# Patient Record
Sex: FEMALE | Race: WHITE | NOT HISPANIC OR LATINO | URBAN - METROPOLITAN AREA
[De-identification: names, ages, dates, MRNs, and addresses within clinical notes are randomized per-mention and may not be internally consistent; named-entity substitution may affect disease eponyms.]

---

## 2017-12-11 ENCOUNTER — EMERGENCY (EMERGENCY)
Facility: HOSPITAL | Age: 77
LOS: 0 days | Discharge: HOME | End: 2017-12-11

## 2017-12-11 DIAGNOSIS — Z87.891 PERSONAL HISTORY OF NICOTINE DEPENDENCE: ICD-10-CM

## 2017-12-11 DIAGNOSIS — R07.89 OTHER CHEST PAIN: ICD-10-CM

## 2017-12-11 DIAGNOSIS — Z79.899 OTHER LONG TERM (CURRENT) DRUG THERAPY: ICD-10-CM

## 2017-12-11 DIAGNOSIS — J44.9 CHRONIC OBSTRUCTIVE PULMONARY DISEASE, UNSPECIFIED: ICD-10-CM

## 2017-12-11 DIAGNOSIS — Z79.82 LONG TERM (CURRENT) USE OF ASPIRIN: ICD-10-CM

## 2017-12-11 DIAGNOSIS — Z79.02 LONG TERM (CURRENT) USE OF ANTITHROMBOTICS/ANTIPLATELETS: ICD-10-CM

## 2020-12-03 ENCOUNTER — INPATIENT (INPATIENT)
Facility: HOSPITAL | Age: 80
LOS: 0 days | Discharge: HOME | End: 2020-12-04
Attending: SURGERY | Admitting: SURGERY
Payer: MEDICARE

## 2020-12-03 VITALS
DIASTOLIC BLOOD PRESSURE: 91 MMHG | SYSTOLIC BLOOD PRESSURE: 180 MMHG | OXYGEN SATURATION: 100 % | RESPIRATION RATE: 18 BRPM | TEMPERATURE: 98 F | HEART RATE: 87 BPM

## 2020-12-03 LAB
ALBUMIN SERPL ELPH-MCNC: 4.3 G/DL — SIGNIFICANT CHANGE UP (ref 3.5–5.2)
ALP SERPL-CCNC: 100 U/L — SIGNIFICANT CHANGE UP (ref 30–115)
ALT FLD-CCNC: 28 U/L — SIGNIFICANT CHANGE UP (ref 0–41)
ANION GAP SERPL CALC-SCNC: 10 MMOL/L — SIGNIFICANT CHANGE UP (ref 7–14)
APTT BLD: 29.9 SEC — SIGNIFICANT CHANGE UP (ref 27–39.2)
AST SERPL-CCNC: 56 U/L — HIGH (ref 0–41)
BASE EXCESS BLDV CALC-SCNC: 2.6 MMOL/L — HIGH (ref -2–2)
BASOPHILS # BLD AUTO: 0 K/UL — SIGNIFICANT CHANGE UP (ref 0–0.2)
BASOPHILS NFR BLD AUTO: 0 % — SIGNIFICANT CHANGE UP (ref 0–1)
BILIRUB SERPL-MCNC: 0.2 MG/DL — SIGNIFICANT CHANGE UP (ref 0.2–1.2)
BUN SERPL-MCNC: 17 MG/DL — SIGNIFICANT CHANGE UP (ref 10–20)
CALCIUM SERPL-MCNC: 8.8 MG/DL — SIGNIFICANT CHANGE UP (ref 8.5–10.1)
CHLORIDE SERPL-SCNC: 106 MMOL/L — SIGNIFICANT CHANGE UP (ref 98–110)
CO2 SERPL-SCNC: 23 MMOL/L — SIGNIFICANT CHANGE UP (ref 17–32)
CREAT SERPL-MCNC: 0.7 MG/DL — SIGNIFICANT CHANGE UP (ref 0.7–1.5)
EOSINOPHIL # BLD AUTO: 0 K/UL — SIGNIFICANT CHANGE UP (ref 0–0.7)
EOSINOPHIL NFR BLD AUTO: 0 % — SIGNIFICANT CHANGE UP (ref 0–8)
ETHANOL SERPL-MCNC: <10 MG/DL — SIGNIFICANT CHANGE UP
GIANT PLATELETS BLD QL SMEAR: PRESENT — SIGNIFICANT CHANGE UP
GLUCOSE SERPL-MCNC: 94 MG/DL — SIGNIFICANT CHANGE UP (ref 70–99)
HCO3 BLDV-SCNC: 29 MMOL/L — SIGNIFICANT CHANGE UP (ref 22–29)
HCT VFR BLD CALC: 40.3 % — SIGNIFICANT CHANGE UP (ref 37–47)
HGB BLD-MCNC: 12.4 G/DL — SIGNIFICANT CHANGE UP (ref 12–16)
INR BLD: 0.92 RATIO — SIGNIFICANT CHANGE UP (ref 0.65–1.3)
LACTATE BLDV-MCNC: 0.9 MMOL/L — SIGNIFICANT CHANGE UP (ref 0.5–1.6)
LACTATE SERPL-SCNC: 1 MMOL/L — SIGNIFICANT CHANGE UP (ref 0.7–2)
LIDOCAIN IGE QN: 53 U/L — SIGNIFICANT CHANGE UP (ref 7–60)
LYMPHOCYTES # BLD AUTO: 1.91 K/UL — SIGNIFICANT CHANGE UP (ref 1.2–3.4)
LYMPHOCYTES # BLD AUTO: 11.3 % — LOW (ref 20.5–51.1)
MANUAL SMEAR VERIFICATION: SIGNIFICANT CHANGE UP
MCHC RBC-ENTMCNC: 27.5 PG — SIGNIFICANT CHANGE UP (ref 27–31)
MCHC RBC-ENTMCNC: 30.8 G/DL — LOW (ref 32–37)
MCV RBC AUTO: 89.4 FL — SIGNIFICANT CHANGE UP (ref 81–99)
MONOCYTES # BLD AUTO: 0.88 K/UL — HIGH (ref 0.1–0.6)
MONOCYTES NFR BLD AUTO: 5.2 % — SIGNIFICANT CHANGE UP (ref 1.7–9.3)
MYELOCYTES NFR BLD: 1.7 % — HIGH (ref 0–0)
NEUTROPHILS # BLD AUTO: 13.37 K/UL — HIGH (ref 1.4–6.5)
NEUTROPHILS NFR BLD AUTO: 75.7 % — HIGH (ref 42.2–75.2)
NEUTS BAND # BLD: 3.5 % — SIGNIFICANT CHANGE UP (ref 0–6)
NRBC # BLD: 1 /100 — HIGH (ref 0–0)
NRBC # BLD: SIGNIFICANT CHANGE UP /100 WBCS (ref 0–0)
PCO2 BLDV: 51 MMHG — SIGNIFICANT CHANGE UP (ref 41–51)
PH BLDV: 7.36 — SIGNIFICANT CHANGE UP (ref 7.26–7.43)
PLAT MORPH BLD: NORMAL — SIGNIFICANT CHANGE UP
PLATELET # BLD AUTO: 282 K/UL — SIGNIFICANT CHANGE UP (ref 130–400)
PO2 BLDV: 37 MMHG — SIGNIFICANT CHANGE UP (ref 20–40)
POIKILOCYTOSIS BLD QL AUTO: SIGNIFICANT CHANGE UP
POTASSIUM SERPL-MCNC: 6.2 MMOL/L — CRITICAL HIGH (ref 3.5–5)
POTASSIUM SERPL-SCNC: 6.2 MMOL/L — CRITICAL HIGH (ref 3.5–5)
PROT SERPL-MCNC: 7.1 G/DL — SIGNIFICANT CHANGE UP (ref 6–8)
PROTHROM AB SERPL-ACNC: 10.6 SEC — SIGNIFICANT CHANGE UP (ref 9.95–12.87)
RBC # BLD: 4.51 M/UL — SIGNIFICANT CHANGE UP (ref 4.2–5.4)
RBC # FLD: 14.3 % — SIGNIFICANT CHANGE UP (ref 11.5–14.5)
RBC BLD AUTO: ABNORMAL
SAO2 % BLDV: 69 % — SIGNIFICANT CHANGE UP
SODIUM SERPL-SCNC: 139 MMOL/L — SIGNIFICANT CHANGE UP (ref 135–146)
TROPONIN T SERPL-MCNC: <0.01 NG/ML — SIGNIFICANT CHANGE UP
VARIANT LYMPHS # BLD: 2.6 % — SIGNIFICANT CHANGE UP (ref 0–5)
WBC # BLD: 16.88 K/UL — HIGH (ref 4.8–10.8)
WBC # FLD AUTO: 16.88 K/UL — HIGH (ref 4.8–10.8)

## 2020-12-03 PROCEDURE — 93010 ELECTROCARDIOGRAM REPORT: CPT

## 2020-12-03 PROCEDURE — 72125 CT NECK SPINE W/O DYE: CPT | Mod: 26

## 2020-12-03 PROCEDURE — 99223 1ST HOSP IP/OBS HIGH 75: CPT

## 2020-12-03 PROCEDURE — 70450 CT HEAD/BRAIN W/O DYE: CPT | Mod: 26

## 2020-12-03 PROCEDURE — 71260 CT THORAX DX C+: CPT | Mod: 26

## 2020-12-03 PROCEDURE — 72170 X-RAY EXAM OF PELVIS: CPT | Mod: 26

## 2020-12-03 PROCEDURE — 71045 X-RAY EXAM CHEST 1 VIEW: CPT | Mod: 26

## 2020-12-03 PROCEDURE — 99285 EMERGENCY DEPT VISIT HI MDM: CPT

## 2020-12-03 PROCEDURE — 74177 CT ABD & PELVIS W/CONTRAST: CPT | Mod: 26

## 2020-12-03 RX ORDER — MORPHINE SULFATE 50 MG/1
2 CAPSULE, EXTENDED RELEASE ORAL ONCE
Refills: 0 | Status: DISCONTINUED | OUTPATIENT
Start: 2020-12-03 | End: 2020-12-03

## 2020-12-03 RX ORDER — IBUPROFEN 200 MG
600 TABLET ORAL ONCE
Refills: 0 | Status: COMPLETED | OUTPATIENT
Start: 2020-12-03 | End: 2020-12-03

## 2020-12-03 RX ORDER — MORPHINE SULFATE 50 MG/1
4 CAPSULE, EXTENDED RELEASE ORAL ONCE
Refills: 0 | Status: DISCONTINUED | OUTPATIENT
Start: 2020-12-03 | End: 2020-12-03

## 2020-12-03 RX ORDER — SODIUM CHLORIDE 9 MG/ML
1000 INJECTION INTRAMUSCULAR; INTRAVENOUS; SUBCUTANEOUS ONCE
Refills: 0 | Status: COMPLETED | OUTPATIENT
Start: 2020-12-03 | End: 2020-12-03

## 2020-12-03 RX ADMIN — Medication 600 MILLIGRAM(S): at 20:10

## 2020-12-03 RX ADMIN — MORPHINE SULFATE 2 MILLIGRAM(S): 50 CAPSULE, EXTENDED RELEASE ORAL at 16:35

## 2020-12-03 RX ADMIN — MORPHINE SULFATE 4 MILLIGRAM(S): 50 CAPSULE, EXTENDED RELEASE ORAL at 14:49

## 2020-12-03 RX ADMIN — SODIUM CHLORIDE 1000 MILLILITER(S): 9 INJECTION INTRAMUSCULAR; INTRAVENOUS; SUBCUTANEOUS at 14:49

## 2020-12-03 NOTE — ED PROVIDER NOTE - CLINICAL SUMMARY MEDICAL DECISION MAKING FREE TEXT BOX
Patient presents after a fall. labs, imaging done. Found to have an age indeterminant compression fracture. Seen by neurosurgery and trauma. no acute intervention. Attempt made to ambulate the patient who felt week and did not feel comfortable ambulating. Patient admitted for further management.

## 2020-12-03 NOTE — ED PROVIDER NOTE - ATTENDING CONTRIBUTION TO CARE
81 yo F pmh htn, cad on plavix pw fall. Mechanical trip and fall from approximately 3 feet. C/o upper back and chest wall pain. No cp prior to fall. No head trauma, no loc, no n/v, no abd pain, no palpitations, no sob, no extremity pain, no neck pain.     TRAUMA: Primary and Secondary surveys intact, GCS 15, no midline CTLS spine tenderness, Pelvis stable, + moving all extremities, FAST Negative   CONSTITUTIONAL: Well-developed; well-nourished; in no acute distress. Sitting up and providing appropriate history and physical examination  SKIN: skin exam is warm and dry, no acute rash.  HEAD: Normocephalic; atraumatic.  EYES: PERRL, 3 mm bilateral, no nystagmus, EOM intact; conjunctiva and sclera clear.  ENT: No nasal discharge; airway clear.  NECK: Supple; non tender. + full passive ROM in all directions. No JVD  CARD: S1, S2 normal; no murmurs, gallops, or rubs. Regular rate and rhythm. + Symmetric Strong Pulses  RESP: No wheezes, rales or rhonchi. Good air movement bilaterally  ABD: soft; non-distended; non-tender. No Rebound, No Guarding, No signs of peritonitis, No CVA tenderness. No pulsatile abdominal mass. + Strong and Symmetric Pulses  EXT: +ttp diffusely over anterior chest wall, +ttp over midline thoracic spine. Normal ROM. No clubbing, cyanosis or edema. Dp and Pt Pulses intact. Cap refill less than 3 seconds  NEURO: CN 2-12 intact, normal finger to nose, normal romberg, no sensory or motor deficits, Alert, oriented, grossly unremarkable. No Focal deficits. GCS 15. NIH 0  PSYCH: Cooperative, appropriate.

## 2020-12-03 NOTE — ED PROVIDER NOTE - OBJECTIVE STATEMENT
80F with PMHx of HTN, CAD on ASA and Plavix s/p stents in 2019 presents to ED for fall today. Pt fell off her front stoop, about 3 feet, no head trauma, no LOC, no AC, +ASA/Plavix. In ED pt complaining of chest wall pain and left flank pain. Endorses mild sob. No cough, abd pain, nausea, vomiting, chest pain, other injuries.

## 2020-12-03 NOTE — ED ADULT NURSE NOTE - NSIMPLEMENTINTERV_GEN_ALL_ED
Implemented All Fall with Harm Risk Interventions:  [place] to call system. Call bell, personal items and telephone within reach. Instruct patient to call for assistance. Room bathroom lighting operational. Non-slip footwear when patient is off stretcher. Physically safe environment: no spills, clutter or unnecessary equipment. Stretcher in lowest position, wheels locked, appropriate side rails in place. Provide visual cue, wrist band, yellow gown, etc. Monitor gait and stability. Monitor for mental status changes and reorient to person, place, and time. Review medications for side effects contributing to fall risk. Reinforce activity limits and safety measures with patient and family. Provide visual clues: red socks.

## 2020-12-03 NOTE — H&P ADULT - ASSESSMENT
ASSESSMENT:  80yF w/ PMHx of HTN and CAD on ASA and Plavix seen as trauma consult s/p fall from steps (about 4 feet) earlier today. Denies LoC, head trauma, and is not on AC, just ASA/Plavix. Trauma assessment in ED: ABCs intact , GCS 15 , AAOx3. Patient reports that she was standing on a pat[employer] stair plat form in the front of her daughter's house when she fell off the side, straight down. Patient reports she does not recall exactly what she landed on, but denies head trauma. Now is complaining of pain mostly in center of her upper back. Patient reports some shortness of breath immediately after fall that has since resolved.   Trauma assessment in ED: ABCs intact , GCS 15 , AAOx3,  FRANKS.     Patient's only injury identified does, age indeterminate mild T6 and L1 vertebral body compression fractures, do not correspond to her pain or trauma this evening. These are likely old fractures. Patient is requesting to go home tonight if she is cleared.    Injuries identified:   - Age indeterminate mild T6 and L1 vertebral body compression fractures    After some time of observation in the ED, patient was having trouble getting up and walking, and did not feel comfortably going home tonight.    PLAN:   - Patient to be admitted to trauma service for further observation and evaluation  - Pain control  - PT/Rehab to see in the morning  - Repeat of potassium level  - Should be given a TSLO brace and Abdominal Binder      Neurosurgery evaluated the patient:  -No acute Neurosurgical intervention  -Conservative therapy with analgesics and PT rehab  -Abdominal binder/TLSO brace  -F/u Neurosurgery x 2 weeks    Senior Resident Addendum  Narrative as above, 80F s/p mechanical trip and fall off porch, -HT, -LOC, on plavix/ASA, complaining of some chest pain. External signs of trauma limited to swelling at base of neck, tender, likely developing hematoma, also visible on CT. CT AP demonstrating age indeterminate T6, L1 fx. Nontender on exam. Cleared from NSY perspective for dc, recommending abdominal binder vs TLSO brace and outpatient followup.       Above plan discussed with Trauma attending, Dr. Altamirano, patient, patient family, and ED team

## 2020-12-03 NOTE — H&P ADULT - ATTENDING COMMENTS
This is 81 y/o female with PMHx of HTN and CAD on ASA and Plavix, s/p fall from steps (about 4 feet) earlier today. Denies LoC, head trauma, and is not on AC. Patient complains of back pain. Has hematoma on the upper back.    Primary and secondary survey were performed.    Airway intact.  GCS 15.  Neck: no step-offs, no pain.  Chest: bilateral breath sounds   Back: hematoma in the upper back and pain to palpation in the T- and L spine.  CV : rrr  Abdomen: soft, nontender.    All images and labs were reviewed.    ASSESSMENT:  81 y/o female, S/P Fall.  T6 compression fracture.  L1 compression fracture.  Back hematoma.  Acute pain due to trauma.    PLAN:  - Neurosurgery evaluation  - pain control  - PT evaluation  - DVT prophylaxis    This note reflects my exam and care of this patient on 12/03/2020.

## 2020-12-03 NOTE — ED ADULT NURSE REASSESSMENT NOTE - NS ED NURSE REASSESS COMMENT FT1
Pt received from previous RN. Pt is awake and alert. Pt given motrin for pain. Trauma team with pt. Pt is not in any distress. Fall precautions maintained. Cardiac and o2 monitoring maintained. Safety and comfort.

## 2020-12-03 NOTE — ED PROVIDER NOTE - PROGRESS NOTE DETAILS
Pt signed out to Dr. Montano, pending CTs, trauma consult. Received sign out from Dr. Arriaga pending ct reads. Found to have age indeterminant compression fractures. trauma consult placed, neurosurgery consulted. SL: Trauma consult placed for compression fractures T6/L1 of undetermined age.  Neurosurgery called - will see patient. SL: Spoke to pt's daughter Cindy Ro. Her phone number is [phone] for updates. AA: Currently unwilling to ambulate. Will s/o and reassess. patient given pain medication and attempted to ambulate. States that she is feeling weak and feels more comfortable staying in the hospital. Surgery notified. Will admit to their service.

## 2020-12-03 NOTE — H&P ADULT - NSHPPHYSICALEXAM_GEN_ALL_CORE
Primary Survey:    A - airway intact  B - bilateral breath sounds and good chest rise  C - palpable pulses in all extremities  D - GCS 15 on arrival, FRANKS  Exposure obtained    Vital Signs Last 24 Hrs  T(C): 36.4 (03 Dec 2020 14:25), Max: 36.4 (03 Dec 2020 14:25)  T(F): 97.6 (03 Dec 2020 14:25), Max: 97.6 (03 Dec 2020 14:25)  HR: 87 (03 Dec 2020 14:25) (87 - 87)  BP: 180/91 (03 Dec 2020 14:25) (180/91 - 180/91)  BP(mean): --  RR: 18 (03 Dec 2020 14:25) (18 - 18)  SpO2: 100% (03 Dec 2020 14:25) (100% - 100%)    Secondary Survey:   General: NAD  HEENT: Normocephalic, atraumatic, EOMI, PEERLA. no scalp lacerations   Neck: Soft, midline trachea. no c-spine tenderness  Chest: Moderate chest wall tenderness diffusely, L>R, no subcutaneous emphysema   Cardiac: S1, S2, RRR  Respiratory: Bilateral breath sounds, clear and equal bilaterally  Abdomen: Soft, non-distended, non-tender, no rebound, no guarding.  Groin: Normal appearing, pelvis stable   Ext:  Moving b/l upper and lower extremities. Palpable Radial b/l UE, b/l DP palpable in LE.   Back: Moderate tenderness to the center of thoracic spine around T1, with edema and erythema. No Lumbar or Sacral tenderness. No palpable runoff/stepoff/deformity  Rectal: No jaylon blood, ROSA with good tone

## 2020-12-03 NOTE — ED PROVIDER NOTE - PHYSICAL EXAMINATION
Constitutional: elderly woman laying in stretcher in NAD  TRAUMA: ABC intact. GCS 15. FAST negative.  Head: Normocephalic, atraumatic.  Eyes: PERRL. EOMI. No Raccoon eyes.   ENT: No nasal discharge. No septal hematoma. No Henry sign. Mucous membranes moist.  Neck: Supple. Painless ROM. No midline tenderness, stepoffs.  Cardiovascular: Normal S1, S2. Regular rate and rhythm. No murmurs, rubs, or gallops.  Pulmonary: Normal respiratory rate and effort. Lungs clear to auscultation bilaterally. No wheezing, rales, or rhonchi. Lung sliding appreciated on US bilaterally.  CHEST: +L chest wall tenderness, crepitus.  Abdominal: Soft. Nondistended. Nontender. No rebound, guarding, rigidity.  BACK: No midline T/L/S tenderness, stepoffs. No saddle paresthesia.  Extremities. Pelvis stable. No traumatic deformities, tenderness of extremities.  Skin: No rashes, cyanosis, lacerations, abrasions.  Neuro: AAOx3. Strength 5/5 in all extremities. Sensation intact throughout. No focal neurological deficits.  Psych: Normal mood. Normal affect.

## 2020-12-03 NOTE — CONSULT NOTE ADULT - ASSESSMENT
ASSESSMENT:  80yF w/ PMHx of HTN and CAD on ASA and Plavix seen as trauma consult s/p fall from steps (about 4 feet) earlier today. Denies LoC, head trauma, and is not on AC, just ASA/Plavix. Trauma assessment in ED: ABCs intact , GCS 15 , AAOx3. Patient reports that she was standing on a [employer] stair plat form in the front of her daughter's house when she fell off the side, straight down. Patient reports she does not recall exactly what she landed on, but denies head trauma. Now is complaining of pain mostly in center of her upper back. Patient reports some shortness of breath immediately after fall that has since resolved.   Trauma assessment in ED: ABCs intact , GCS 15 , AAOx3,  FRANKS.     Patient's only injury identified does, age indeterminate mild T6 and L1 vertebral body compression fractures, do not correspond to her pain or trauma this evening. These are likely old fractures. Patient is requesting to go home tonight if she is cleared.    Injuries identified:   - Age indeterminate mild T6 and L1 vertebral body compression fractures    PLAN:     - Patient is cleared from a trauma surgery standpoint  - Recommend repeat of potassium level before discharge  - Should be given a TSLO brace and Abdominal Binder  - Can followup with Dr. Altamirano in 1-2 weeks.    Neurosurgery evaluated the patient:  -No acute Neurosurgical intervention  -Conservative therapy with analgesics and PT rehab  -Abdominal binder/TLSO brace  -F/u Neurosurgery x 2 weeks        Above plan discussed with Trauma attending, Dr. Altamirano, patient, patient family, and ED team  --------------------------------------------------------------------------------------  12-03-20 @ 19:20   ASSESSMENT:  80yF w/ PMHx of HTN and CAD on ASA and Plavix seen as trauma consult s/p fall from steps (about 4 feet) earlier today. Denies LoC, head trauma, and is not on AC, just ASA/Plavix. Trauma assessment in ED: ABCs intact , GCS 15 , AAOx3. Patient reports that she was standing on a [employer] stair plat form in the front of her daughter's house when she fell off the side, straight down. Patient reports she does not recall exactly what she landed on, but denies head trauma. Now is complaining of pain mostly in center of her upper back. Patient reports some shortness of breath immediately after fall that has since resolved.   Trauma assessment in ED: ABCs intact , GCS 15 , AAOx3,  FRANKS.     Patient's only injury identified does, age indeterminate mild T6 and L1 vertebral body compression fractures, do not correspond to her pain or trauma this evening. These are likely old fractures. Patient is requesting to go home tonight if she is cleared.    Injuries identified:   - Age indeterminate mild T6 and L1 vertebral body compression fractures    PLAN:     - Patient is cleared from a trauma surgery standpoint  - Recommend repeat of potassium level, hemolyzed before discharge  - Should be given a TSLO brace and Abdominal Binder  - Can followup with Dr. Altamirano in 1-2 weeks.    Neurosurgery evaluated the patient:  -No acute Neurosurgical intervention  -Conservative therapy with analgesics and PT rehab  -Abdominal binder/TLSO brace  -F/u Neurosurgery x 2 weeks    Senior Resident Addendum  Narrative as above, 80F s/p mechanical trip and fall off porch, -HT, -LOC, on plavix/ASA, complaining of some chest pain. External signs of trauma limited to swelling at base of neck, tender, likely developing hematoma, also visible on CT. CT AP demonstrating age indeterminate T6, L1 fx. Nontender on exam. Cleared from NSY perspective for dc, recommending abdominal binder vs TLSO brace and outpatient followup. Cleared from trauma perspective, patient reporting that she wishes to go home.       Above plan discussed with Trauma attending, Dr. Altamirano, patient, patient family, and ED team  --------------------------------------------------------------------------------------  12-03-20 @ 19:20

## 2020-12-03 NOTE — CONSULT NOTE ADULT - SUBJECTIVE AND OBJECTIVE BOX
TRAUMA ACTIVATION LEVEL:  TRAUMA CONSULT    MECHANISM OF INJURY:   [] Blunt     [] MVC	  [x] Fall	  [] Pedestrian Struck	  [] Motorcycle     [] Assault     [] Bicycle collision    [] Sports injury    [] Penetrating    [] Gun Shot Wound      [] Stab Wound    GCS: 15 	E: 4	V: 5	M: 6    HPI:  80yF w/ PMHx of HTN and CAD on ASA and Plavix seen as trauma consult s/p fall from steps (about 4 feet) earlier today. Denies LoC, head trauma, and is not on AC, just ASA/Plavix. Trauma assessment in ED: ABCs intact , GCS 15 , AAOx3. Patient reports that she was standing on a [employer] stair plat form in the front of her daughter's house when she fell off the side, straight down. Patient reports she does not recall exactly what she landed on, but denies head trauma. Now is complaining of pain mostly in center of her upper back. Patient reports some shortness of breath immediately after fall that has since resolved. Otherwise: No fevers, no chills, no abdominal pain, no nausea, no vomiting, no other recent trauma.    PAST MEDICAL & SURGICAL HISTORY:  Hypertension  CAD (coronary artery disease)      Allergies    No Known Allergies    Intolerances      Home Medications:  Atorvastatin 80  Diltiazem 30 TID  ASA 81  Plavix 75 every  other day    ROS: 10-system review is otherwise negative except HPI above.      Primary Survey:    A - airway intact  B - bilateral breath sounds and good chest rise  C - palpable pulses in all extremities  D - GCS 15 on arrival, FRANKS  Exposure obtained    Vital Signs Last 24 Hrs  T(C): 36.4 (03 Dec 2020 14:25), Max: 36.4 (03 Dec 2020 14:25)  T(F): 97.6 (03 Dec 2020 14:25), Max: 97.6 (03 Dec 2020 14:25)  HR: 87 (03 Dec 2020 14:25) (87 - 87)  BP: 180/91 (03 Dec 2020 14:25) (180/91 - 180/91)  BP(mean): --  RR: 18 (03 Dec 2020 14:25) (18 - 18)  SpO2: 100% (03 Dec 2020 14:25) (100% - 100%)    Secondary Survey:   General: NAD  HEENT: Normocephalic, atraumatic, EOMI, PEERLA. no scalp lacerations   Neck: Soft, midline trachea. no c-spine tenderness  Chest: Moderate chest wall tenderness diffusely, L>R, no subcutaneous emphysema   Cardiac: S1, S2, RRR  Respiratory: Bilateral breath sounds, clear and equal bilaterally  Abdomen: Soft, non-distended, non-tender, no rebound, no guarding.  Groin: Normal appearing, pelvis stable   Ext:  Moving b/l upper and lower extremities. Palpable Radial b/l UE, b/l DP palpable in LE.   Back: Moderate tenderness to the center of thoracic spine around T1, with edema and erythema. No Lumbar or Sacral tenderness. No palpable runoff/stepoff/deformity  Rectal: No jaylon blood, ROSA with good tone    Labs:  CAPILLARY BLOOD GLUCOSE                              12.4   16.88 )-----------( 282      ( 03 Dec 2020 14:58 )             40.3       Auto Neutrophil %: 75.7 % (12-03-20 @ 14:58)  Band Neutrophils %: 3.5 % (12-03-20 @ 14:58)    12-03    139  |  106  |  17  ----------------------------<  94  6.2<HH>   |  23  |  0.7      Calcium, Total Serum: 8.8 mg/dL (12-03-20 @ 14:58)      LFTs:             7.1  | 0.2  | 56       ------------------[100     ( 03 Dec 2020 14:58 )  4.3  | x    | 28          Lipase:53     Amylase:x        Lactate, Blood: 1.0 mmol/L (12-03-20 @ 14:58)  Blood Gas Venous - Lactate: 0.9 mmoL/L (12-03-20 @ 14:52)      Coags:     10.60  ----< 0.92    ( 03 Dec 2020 14:58 )     29.9        CARDIAC MARKERS ( 03 Dec 2020 14:58 )  x     / <0.01 ng/mL / x     / x     / x          Alcohol, Blood: <10 mg/dL (12-03-20 @ 14:58)      RADIOLOGY & ADDITIONAL STUDIES:  < from: CT Head No Cont (12.03.20 @ 16:23) >  IMPRESSION:  1.  Motion limited study. No evidence of acute intracranial hemorrhage.  2.  Moderate to severe chronic microvascular changes.  < end of copied text >    < from: CT Cervical Spine No Cont (12.03.20 @ 16:23) >  IMPRESSION:  1.  No evidence of acute cervical spine fracture or subluxation.  2.  Diffuse osteopenia and mild-moderate degenerative changes.  < end of copied text >    < from: CT Chest w/ IV Cont (12.03.20 @ 16:24) >  IMPRESSION: No acute intrathoracic or intra-abdominal/pelvic traumatic changes.  Centrilobular emphysema.  Subsegmental atelectasis of right middle and lower lobes.  Mild intrahepatic biliary ductal dilatation, cholelithiasis and porcelain gallbladder.  Two left ovarian/adnexal cystic lesions measuring 2.6 cm each. In a postmenopausal patient, recommend pelvic ultrasound follow-up in 3-6 months.  Age indeterminate mild T6 and L1 vertebral body compression fractures, as above.  Infrarenal abdominal aortic aneurysm measuring up to 3.9 cm.  < end of copied text >    < from: CT Abdomen and Pelvis w/ IV Cont (12.03.20 @ 16:24) >  IMPRESSION: No acute intrathoracic or intra-abdominal/pelvic traumatic changes.  Centrilobular emphysema.  Subsegmental atelectasis of right middle and lower lobes.  Mild intrahepatic biliary ductal dilatation, cholelithiasis and porcelain gallbladder.  Two left ovarian/adnexal cystic lesions measuring 2.6 cm each. In a postmenopausal patient, recommend pelvic ultrasound follow-up in 3-6 months.  Age indeterminate mild T6 and L1 vertebral body compression fractures, as above.  Infrarenal abdominal aortic aneurysm measuring up to 3.9 cm.  < end of copied text >    ---------------------------------------------------------------------------------------

## 2020-12-03 NOTE — ED PROVIDER NOTE - NS ED ROS FT
Constitutional: No altered mental status.  Eyes: No visual changes.  ENT: No hearing loss.  Neck: No neck pain or stiffness.  Cardiovascular: +chest wall pain. No palpitations.  Pulmonary: +SOB. No hemoptysis.  Abdominal: No abdominal pain, nausea, vomiting.   Neuro: No headache, syncope, dizziness.  MS: No back pain. No deformities.

## 2020-12-03 NOTE — CONSULT NOTE ADULT - SUBJECTIVE AND OBJECTIVE BOX
HPI:  This is a 80 year old female with PMH of htn, cad on asa/plvix presenting to the ED s/p fall -HT, -LOC.  The pt was on her front mikaela today and fell backwards while trying to gain control of her dogs.  Work up revealing: L1 vertebral body compression fracture and mild compression deformity of T6 vertebral body.    Pt examined at the bedside in ED.  The Pt is currently laying in bed comfortably, A+Ox3 in NAD.  The pt is however endorsing b/l shoulder pain,  and non radiating mid thoracic pain on flexion-extension of the hip, otherwise with no other complaints,   pt denies: HT, weakness, numbness, paraesthesia, radicular pain, paralysis, n/v      PAST MEDICAL & SURGICAL HISTORY:  Hypertension    CAD (coronary artery disease)        Home Medications:      Allergies    No Known Allergies    Intolerances        ROS:  [ x ] A ten-point review of systems is negative except as noted   [  ] Due to altered mental status/intubation, subjective information were not able to be obtained from the patient. History was obtained, to the extent possible, from review of the chart and collateral sources of information    MEDICATIONS  (STANDING):    MEDICATIONS  (PRN):      ICU Vital Signs Last 24 Hrs  T(C): 36.4 (03 Dec 2020 14:25), Max: 36.4 (03 Dec 2020 14:25)  T(F): 97.6 (03 Dec 2020 14:25), Max: 97.6 (03 Dec 2020 14:25)  HR: 87 (03 Dec 2020 14:25) (87 - 87)  BP: 180/91 (03 Dec 2020 14:25) (180/91 - 180/91)  BP(mean): --  ABP: --  ABP(mean): --  RR: 18 (03 Dec 2020 14:25) (18 - 18)  SpO2: 100% (03 Dec 2020 14:25) (100% - 100%)      I&O's Detail      CBC Full  -  ( 03 Dec 2020 14:58 )  WBC Count : 16.88 K/uL  RBC Count : 4.51 M/uL  Hemoglobin : 12.4 g/dL  Hematocrit : 40.3 %  Platelet Count - Automated : 282 K/uL  Mean Cell Volume : 89.4 fL  Mean Cell Hemoglobin : 27.5 pg  Mean Cell Hemoglobin Concentration : 30.8 g/dL  Auto Neutrophil # : 13.37 K/uL  Auto Lymphocyte # : 1.91 K/uL  Auto Monocyte # : 0.88 K/uL  Auto Eosinophil # : 0.00 K/uL  Auto Basophil # : 0.00 K/uL  Auto Neutrophil % : 75.7 %  Auto Lymphocyte % : 11.3 %  Auto Monocyte % : 5.2 %  Auto Eosinophil % : 0.0 %  Auto Basophil % : 0.0 %    12-03    139  |  106  |  17  ----------------------------<  94  6.2<HH>   |  23  |  0.7    Ca    8.8      03 Dec 2020 14:58    TPro  7.1  /  Alb  4.3  /  TBili  0.2  /  DBili  x   /  AST  56<H>  /  ALT  28  /  AlkPhos  100  12-03    CARDIAC MARKERS ( 03 Dec 2020 14:58 )  x     / <0.01 ng/mL / x     / x     / x              AAOX3. Verbal function intact  facial motions symmetric  EOMI  Motor: MAEx4, 5/5 power in b/l UE and LE  Sensation: intact to light touch in all extremities          Imaging:  < from: CT Abdomen and Pelvis w/ IV Cont (12.03.20 @ 16:24) >  BONES/SOFT TISSUES: Dextroscoliosis of the thoracolumbar spine, diffuse osteopenia, with mild superior endplate compression fracture of L1 vertebral body and mild compression deformity of T6 vertebral body, of indeterminate age. There are degenerative changes of the sacroiliac joints, bilateral hips and pubic symphysis.      < end of copied text >      Assessment/Plan  This is a 80 year old female with PMH of htn, cad on asa/plvix presenting to the ED s/p fall -HT, -LOC.  The pt was on her front mikaela today and fell backwards while trying to gain control of her dogs. Work up revealing L1 vertebral body compression fracture and mild compression deformity of T6 vertebral body.  The Pt is currently laying in bed comfortably, A+Ox3 in NAD.  The pt is however endorsing b/l shoulder pain,  and non radiating mid thoracic pain on flexion-extension of the hip, otherwise with no other complaints.      -Images reviewed  -No acute Neurosurgical intervention  -Conservative therapy with analgesics and PT rehab, if unable to ambulate then MRI T/L spine  -TLSO brace  -F/u trauma recs   HPI:  This is a 80 year old female with PMH of htn, cad on asa/plvix presenting to the ED s/p fall -HT, -LOC.  The pt was on her front mikaela today and fell backwards while trying to gain control of her dogs.  Work up revealing: L1 vertebral body compression fracture and mild compression deformity of T6 vertebral body.    Pt examined at the bedside in ED.  The Pt is currently laying in bed comfortably, A+Ox3 in NAD.  The pt is however endorsing b/l shoulder pain,  and non radiating mid thoracic pain on flexion-extension of the hip, otherwise with no other complaints,   pt denies: HT, weakness, numbness, paraesthesia, radicular pain, paralysis, n/v      PAST MEDICAL & SURGICAL HISTORY:  Hypertension    CAD (coronary artery disease)        Home Medications:      Allergies    No Known Allergies    Intolerances        ROS:  [ x ] A ten-point review of systems is negative except as noted   [  ] Due to altered mental status/intubation, subjective information were not able to be obtained from the patient. History was obtained, to the extent possible, from review of the chart and collateral sources of information    MEDICATIONS  (STANDING):    MEDICATIONS  (PRN):      ICU Vital Signs Last 24 Hrs  T(C): 36.4 (03 Dec 2020 14:25), Max: 36.4 (03 Dec 2020 14:25)  T(F): 97.6 (03 Dec 2020 14:25), Max: 97.6 (03 Dec 2020 14:25)  HR: 87 (03 Dec 2020 14:25) (87 - 87)  BP: 180/91 (03 Dec 2020 14:25) (180/91 - 180/91)  BP(mean): --  ABP: --  ABP(mean): --  RR: 18 (03 Dec 2020 14:25) (18 - 18)  SpO2: 100% (03 Dec 2020 14:25) (100% - 100%)      I&O's Detail      CBC Full  -  ( 03 Dec 2020 14:58 )  WBC Count : 16.88 K/uL  RBC Count : 4.51 M/uL  Hemoglobin : 12.4 g/dL  Hematocrit : 40.3 %  Platelet Count - Automated : 282 K/uL  Mean Cell Volume : 89.4 fL  Mean Cell Hemoglobin : 27.5 pg  Mean Cell Hemoglobin Concentration : 30.8 g/dL  Auto Neutrophil # : 13.37 K/uL  Auto Lymphocyte # : 1.91 K/uL  Auto Monocyte # : 0.88 K/uL  Auto Eosinophil # : 0.00 K/uL  Auto Basophil # : 0.00 K/uL  Auto Neutrophil % : 75.7 %  Auto Lymphocyte % : 11.3 %  Auto Monocyte % : 5.2 %  Auto Eosinophil % : 0.0 %  Auto Basophil % : 0.0 %    12-03    139  |  106  |  17  ----------------------------<  94  6.2<HH>   |  23  |  0.7    Ca    8.8      03 Dec 2020 14:58    TPro  7.1  /  Alb  4.3  /  TBili  0.2  /  DBili  x   /  AST  56<H>  /  ALT  28  /  AlkPhos  100  12-03    CARDIAC MARKERS ( 03 Dec 2020 14:58 )  x     / <0.01 ng/mL / x     / x     / x              AAOX3. Verbal function intact  facial motions symmetric  EOMI  Motor: MAEx4, 5/5 power in b/l UE and LE  Sensation: intact to light touch in all extremities          Imaging:  < from: CT Abdomen and Pelvis w/ IV Cont (12.03.20 @ 16:24) >  BONES/SOFT TISSUES: Dextroscoliosis of the thoracolumbar spine, diffuse osteopenia, with mild superior endplate compression fracture of L1 vertebral body and mild compression deformity of T6 vertebral body, of indeterminate age. There are degenerative changes of the sacroiliac joints, bilateral hips and pubic symphysis.      < end of copied text >      Assessment/Plan  This is a 80 year old female with PMH of htn, cad on asa/plvix presenting to the ED s/p fall -HT, -LOC.  The pt was on her front mikaela today and fell backwards while trying to gain control of her dogs. Work up revealing L1 vertebral body compression fracture and mild compression deformity of T6 vertebral body.  The Pt is currently laying in bed comfortably, A+Ox3 in NAD.  The pt is however endorsing b/l shoulder pain,  and non radiating mid thoracic pain on flexion-extension of the hip, otherwise with no other complaints.      -Images reviewed  -No acute Neurosurgical intervention  -Conservative therapy with analgesics and PT rehab  -Abdominal binder/TLSO brace  -F/u trauma recs  -F/u Neurosurgery x 2 weeks

## 2020-12-03 NOTE — H&P ADULT - NSHPLABSRESULTS_GEN_ALL_CORE
Labs:  CAPILLARY BLOOD GLUCOSE                              12.4   16.88 )-----------( 282      ( 03 Dec 2020 14:58 )             40.3       Auto Neutrophil %: 75.7 % (12-03-20 @ 14:58)  Band Neutrophils %: 3.5 % (12-03-20 @ 14:58)    12-03    139  |  106  |  17  ----------------------------<  94  6.2<HH>   |  23  |  0.7      Calcium, Total Serum: 8.8 mg/dL (12-03-20 @ 14:58)      LFTs:             7.1  | 0.2  | 56       ------------------[100     ( 03 Dec 2020 14:58 )  4.3  | x    | 28          Lipase:53     Amylase:x        Lactate, Blood: 1.0 mmol/L (12-03-20 @ 14:58)  Blood Gas Venous - Lactate: 0.9 mmoL/L (12-03-20 @ 14:52)      Coags:     10.60  ----< 0.92    ( 03 Dec 2020 14:58 )     29.9        CARDIAC MARKERS ( 03 Dec 2020 14:58 )  x     / <0.01 ng/mL / x     / x     / x          Alcohol, Blood: <10 mg/dL (12-03-20 @ 14:58)      RADIOLOGY & ADDITIONAL STUDIES:  < from: CT Head No Cont (12.03.20 @ 16:23) >  IMPRESSION:  1.  Motion limited study. No evidence of acute intracranial hemorrhage.  2.  Moderate to severe chronic microvascular changes.  < end of copied text >    < from: CT Cervical Spine No Cont (12.03.20 @ 16:23) >  IMPRESSION:  1.  No evidence of acute cervical spine fracture or subluxation.  2.  Diffuse osteopenia and mild-moderate degenerative changes.  < end of copied text >    < from: CT Chest w/ IV Cont (12.03.20 @ 16:24) >  IMPRESSION: No acute intrathoracic or intra-abdominal/pelvic traumatic changes.  Centrilobular emphysema.  Subsegmental atelectasis of right middle and lower lobes.  Mild intrahepatic biliary ductal dilatation, cholelithiasis and porcelain gallbladder.  Two left ovarian/adnexal cystic lesions measuring 2.6 cm each. In a postmenopausal patient, recommend pelvic ultrasound follow-up in 3-6 months.  Age indeterminate mild T6 and L1 vertebral body compression fractures, as above.  Infrarenal abdominal aortic aneurysm measuring up to 3.9 cm.  < end of copied text >    < from: CT Abdomen and Pelvis w/ IV Cont (12.03.20 @ 16:24) >  IMPRESSION: No acute intrathoracic or intra-abdominal/pelvic traumatic changes.  Centrilobular emphysema.  Subsegmental atelectasis of right middle and lower lobes.  Mild intrahepatic biliary ductal dilatation, cholelithiasis and porcelain gallbladder.  Two left ovarian/adnexal cystic lesions measuring 2.6 cm each. In a postmenopausal patient, recommend pelvic ultrasound follow-up in 3-6 months.  Age indeterminate mild T6 and L1 vertebral body compression fractures, as above.  Infrarenal abdominal aortic aneurysm measuring up to 3.9 cm.  < end of copied text >

## 2020-12-03 NOTE — ED ADULT NURSE NOTE - ALCOHOL PRE SCREEN (AUDIT - C)
"Chief Complaint   Patient presents with     Diabetes     no concerns       Initial /60 (BP Location: Left arm, Patient Position: Sitting, Cuff Size: Adult Large)  Pulse 56  Temp 97.6  F (36.4  C) (Tympanic)  Resp 14  Ht 5' 7\" (1.702 m)  Wt 224 lb (101.6 kg)  SpO2 96%  BMI 35.08 kg/m2 Estimated body mass index is 35.08 kg/(m^2) as calculated from the following:    Height as of this encounter: 5' 7\" (1.702 m).    Weight as of this encounter: 224 lb (101.6 kg).  Medication Reconciliation: complete   Jenifer Villa      " Statement Selected

## 2020-12-03 NOTE — H&P ADULT - HISTORY OF PRESENT ILLNESS
TRAUMA ACTIVATION LEVEL:  TRAUMA CONSULT    MECHANISM OF INJURY:   [] Blunt     [] MVC	  [x] Fall	  [] Pedestrian Struck	  [] Motorcycle     [] Assault     [] Bicycle collision    [] Sports injury    [] Penetrating    [] Gun Shot Wound      [] Stab Wound    GCS: 15 	E: 4	V: 5	M: 6    HPI:  80yF w/ PMHx of HTN and CAD on ASA and Plavix seen as trauma consult s/p fall from steps (about 4 feet) earlier today. Denies LoC, head trauma, and is not on AC, just ASA/Plavix. Trauma assessment in ED: ABCs intact , GCS 15 , AAOx3. Patient reports that she was standing on a RMI stair plat form in the front of her daughter's house when she fell off the side, straight down. Patient reports she does not recall exactly what she landed on, but denies head trauma. Now is complaining of pain mostly in center of her upper back. Patient reports some shortness of breath immediately after fall that has since resolved. Otherwise: No fevers, no chills, no abdominal pain, no nausea, no vomiting, no other recent trauma.    PAST MEDICAL & SURGICAL HISTORY:  Hypertension  CAD (coronary artery disease)      Allergies    No Known Allergies    Intolerances      Home Medications:  Atorvastatin 80  Diltiazem 30 TID  ASA 81  Plavix 75 every  other day

## 2020-12-03 NOTE — CONSULT NOTE ADULT - ATTENDING COMMENTS
CT of the chest abdomen pelvis was reviewed by me.  There is evidence of an L1 superior endplate compression deformity, without any obvious retropulsion.    Mild T6 compression deformity is also noted.  At present time, recommend a course of conservative treatment by means of bracing, pain control, and physical therapy/rehabilitation.  If patient fails, the patient may be candidate for kyphoplasty.

## 2020-12-04 ENCOUNTER — TRANSCRIPTION ENCOUNTER (OUTPATIENT)
Age: 80
End: 2020-12-04

## 2020-12-04 VITALS
TEMPERATURE: 99 F | SYSTOLIC BLOOD PRESSURE: 149 MMHG | DIASTOLIC BLOOD PRESSURE: 89 MMHG | OXYGEN SATURATION: 94 % | RESPIRATION RATE: 19 BRPM | HEART RATE: 98 BPM

## 2020-12-04 LAB
ANION GAP SERPL CALC-SCNC: 10 MMOL/L — SIGNIFICANT CHANGE UP (ref 7–14)
ANION GAP SERPL CALC-SCNC: 8 MMOL/L — SIGNIFICANT CHANGE UP (ref 7–14)
APPEARANCE UR: CLEAR — SIGNIFICANT CHANGE UP
BILIRUB UR-MCNC: NEGATIVE — SIGNIFICANT CHANGE UP
BUN SERPL-MCNC: 14 MG/DL — SIGNIFICANT CHANGE UP (ref 10–20)
BUN SERPL-MCNC: 16 MG/DL — SIGNIFICANT CHANGE UP (ref 10–20)
CALCIUM SERPL-MCNC: 8.3 MG/DL — LOW (ref 8.5–10.1)
CALCIUM SERPL-MCNC: 8.4 MG/DL — LOW (ref 8.5–10.1)
CHLORIDE SERPL-SCNC: 107 MMOL/L — SIGNIFICANT CHANGE UP (ref 98–110)
CHLORIDE SERPL-SCNC: 108 MMOL/L — SIGNIFICANT CHANGE UP (ref 98–110)
CO2 SERPL-SCNC: 24 MMOL/L — SIGNIFICANT CHANGE UP (ref 17–32)
CO2 SERPL-SCNC: 24 MMOL/L — SIGNIFICANT CHANGE UP (ref 17–32)
COLOR SPEC: YELLOW — SIGNIFICANT CHANGE UP
CREAT SERPL-MCNC: 0.5 MG/DL — LOW (ref 0.7–1.5)
CREAT SERPL-MCNC: 0.6 MG/DL — LOW (ref 0.7–1.5)
DIFF PNL FLD: ABNORMAL
GLUCOSE SERPL-MCNC: 114 MG/DL — HIGH (ref 70–99)
GLUCOSE SERPL-MCNC: 95 MG/DL — SIGNIFICANT CHANGE UP (ref 70–99)
GLUCOSE UR QL: NEGATIVE — SIGNIFICANT CHANGE UP
HCT VFR BLD CALC: 33.8 % — LOW (ref 37–47)
HGB BLD-MCNC: 10.6 G/DL — LOW (ref 12–16)
KETONES UR-MCNC: NEGATIVE — SIGNIFICANT CHANGE UP
LEUKOCYTE ESTERASE UR-ACNC: NEGATIVE — SIGNIFICANT CHANGE UP
MAGNESIUM SERPL-MCNC: 2 MG/DL — SIGNIFICANT CHANGE UP (ref 1.8–2.4)
MCHC RBC-ENTMCNC: 28 PG — SIGNIFICANT CHANGE UP (ref 27–31)
MCHC RBC-ENTMCNC: 31.4 G/DL — LOW (ref 32–37)
MCV RBC AUTO: 89.4 FL — SIGNIFICANT CHANGE UP (ref 81–99)
NITRITE UR-MCNC: NEGATIVE — SIGNIFICANT CHANGE UP
NRBC # BLD: 0 /100 WBCS — SIGNIFICANT CHANGE UP (ref 0–0)
PH UR: 6 — SIGNIFICANT CHANGE UP (ref 5–8)
PHOSPHATE SERPL-MCNC: 3.2 MG/DL — SIGNIFICANT CHANGE UP (ref 2.1–4.9)
PLATELET # BLD AUTO: 212 K/UL — SIGNIFICANT CHANGE UP (ref 130–400)
POTASSIUM SERPL-MCNC: 4.2 MMOL/L — SIGNIFICANT CHANGE UP (ref 3.5–5)
POTASSIUM SERPL-MCNC: 4.4 MMOL/L — SIGNIFICANT CHANGE UP (ref 3.5–5)
POTASSIUM SERPL-SCNC: 4.2 MMOL/L — SIGNIFICANT CHANGE UP (ref 3.5–5)
POTASSIUM SERPL-SCNC: 4.4 MMOL/L — SIGNIFICANT CHANGE UP (ref 3.5–5)
PROT UR-MCNC: NEGATIVE — SIGNIFICANT CHANGE UP
RAPID RVP RESULT: SIGNIFICANT CHANGE UP
RBC # BLD: 3.78 M/UL — LOW (ref 4.2–5.4)
RBC # FLD: 14.5 % — SIGNIFICANT CHANGE UP (ref 11.5–14.5)
RBC CASTS # UR COMP ASSIST: SIGNIFICANT CHANGE UP /HPF (ref 0–4)
SARS-COV-2 RNA SPEC QL NAA+PROBE: SIGNIFICANT CHANGE UP
SODIUM SERPL-SCNC: 140 MMOL/L — SIGNIFICANT CHANGE UP (ref 135–146)
SODIUM SERPL-SCNC: 141 MMOL/L — SIGNIFICANT CHANGE UP (ref 135–146)
SP GR SPEC: 1.01 — SIGNIFICANT CHANGE UP (ref 1.01–1.03)
UROBILINOGEN FLD QL: SIGNIFICANT CHANGE UP
WBC # BLD: 9.38 K/UL — SIGNIFICANT CHANGE UP (ref 4.8–10.8)
WBC # FLD AUTO: 9.38 K/UL — SIGNIFICANT CHANGE UP (ref 4.8–10.8)

## 2020-12-04 PROCEDURE — 99232 SBSQ HOSP IP/OBS MODERATE 35: CPT

## 2020-12-04 RX ORDER — CLOPIDOGREL BISULFATE 75 MG/1
1 TABLET, FILM COATED ORAL
Qty: 0 | Refills: 0 | DISCHARGE

## 2020-12-04 RX ORDER — ASPIRIN/CALCIUM CARB/MAGNESIUM 324 MG
1 TABLET ORAL
Qty: 0 | Refills: 0 | DISCHARGE

## 2020-12-04 RX ORDER — DILTIAZEM HCL 120 MG
30 CAPSULE, EXT RELEASE 24 HR ORAL THREE TIMES A DAY
Refills: 0 | Status: DISCONTINUED | OUTPATIENT
Start: 2020-12-04 | End: 2020-12-04

## 2020-12-04 RX ORDER — ACETAMINOPHEN 500 MG
650 TABLET ORAL EVERY 6 HOURS
Refills: 0 | Status: DISCONTINUED | OUTPATIENT
Start: 2020-12-04 | End: 2020-12-04

## 2020-12-04 RX ORDER — SENNA PLUS 8.6 MG/1
2 TABLET ORAL AT BEDTIME
Refills: 0 | Status: DISCONTINUED | OUTPATIENT
Start: 2020-12-04 | End: 2020-12-04

## 2020-12-04 RX ORDER — OXYCODONE HYDROCHLORIDE 5 MG/1
5 TABLET ORAL EVERY 6 HOURS
Refills: 0 | Status: DISCONTINUED | OUTPATIENT
Start: 2020-12-04 | End: 2020-12-04

## 2020-12-04 RX ORDER — ATORVASTATIN CALCIUM 80 MG/1
80 TABLET, FILM COATED ORAL AT BEDTIME
Refills: 0 | Status: DISCONTINUED | OUTPATIENT
Start: 2020-12-04 | End: 2020-12-04

## 2020-12-04 RX ORDER — PANTOPRAZOLE SODIUM 20 MG/1
40 TABLET, DELAYED RELEASE ORAL
Refills: 0 | Status: DISCONTINUED | OUTPATIENT
Start: 2020-12-04 | End: 2020-12-04

## 2020-12-04 RX ORDER — BRINZOLAMIDE/BRIMONIDINE TARTRATE 10; 2 MG/ML; MG/ML
1 SUSPENSION/ DROPS OPHTHALMIC
Qty: 0 | Refills: 0 | DISCHARGE

## 2020-12-04 RX ORDER — ENOXAPARIN SODIUM 100 MG/ML
40 INJECTION SUBCUTANEOUS DAILY
Refills: 0 | Status: DISCONTINUED | OUTPATIENT
Start: 2020-12-04 | End: 2020-12-04

## 2020-12-04 RX ORDER — BUDESONIDE AND FORMOTEROL FUMARATE DIHYDRATE 160; 4.5 UG/1; UG/1
2 AEROSOL RESPIRATORY (INHALATION)
Refills: 0 | Status: DISCONTINUED | OUTPATIENT
Start: 2020-12-04 | End: 2020-12-04

## 2020-12-04 RX ORDER — BUDESONIDE AND FORMOTEROL FUMARATE DIHYDRATE 160; 4.5 UG/1; UG/1
2 AEROSOL RESPIRATORY (INHALATION)
Qty: 0 | Refills: 0 | DISCHARGE

## 2020-12-04 RX ORDER — ASPIRIN/CALCIUM CARB/MAGNESIUM 324 MG
81 TABLET ORAL DAILY
Refills: 0 | Status: DISCONTINUED | OUTPATIENT
Start: 2020-12-04 | End: 2020-12-04

## 2020-12-04 RX ORDER — CLOPIDOGREL BISULFATE 75 MG/1
75 TABLET, FILM COATED ORAL
Refills: 0 | Status: DISCONTINUED | OUTPATIENT
Start: 2020-12-04 | End: 2020-12-04

## 2020-12-04 RX ORDER — DILTIAZEM HCL 120 MG
1 CAPSULE, EXT RELEASE 24 HR ORAL
Qty: 0 | Refills: 0 | DISCHARGE

## 2020-12-04 RX ORDER — ATORVASTATIN CALCIUM 80 MG/1
1 TABLET, FILM COATED ORAL
Qty: 0 | Refills: 0 | DISCHARGE

## 2020-12-04 RX ORDER — ACETAMINOPHEN 500 MG
2 TABLET ORAL
Qty: 0 | Refills: 0 | DISCHARGE
Start: 2020-12-04

## 2020-12-04 RX ORDER — GLYCOPYRROLATE AND FORMOTEROL FUMARATE 9; 4.8 UG/1; UG/1
2 AEROSOL, METERED RESPIRATORY (INHALATION)
Qty: 0 | Refills: 0 | DISCHARGE

## 2020-12-04 RX ORDER — GLYCOPYRROLATE AND FORMOTEROL FUMARATE 9; 4.8 UG/1; UG/1
2 AEROSOL, METERED RESPIRATORY (INHALATION)
Refills: 0 | Status: DISCONTINUED | OUTPATIENT
Start: 2020-12-04 | End: 2020-12-04

## 2020-12-04 RX ORDER — SODIUM CHLORIDE 9 MG/ML
1000 INJECTION INTRAMUSCULAR; INTRAVENOUS; SUBCUTANEOUS
Refills: 0 | Status: DISCONTINUED | OUTPATIENT
Start: 2020-12-04 | End: 2020-12-04

## 2020-12-04 RX ORDER — ASPIRIN/CALCIUM CARB/MAGNESIUM 324 MG
81 TABLET ORAL
Refills: 0 | Status: DISCONTINUED | OUTPATIENT
Start: 2020-12-04 | End: 2020-12-04

## 2020-12-04 RX ADMIN — Medication 30 MILLIGRAM(S): at 05:29

## 2020-12-04 RX ADMIN — Medication 81 MILLIGRAM(S): at 05:29

## 2020-12-04 RX ADMIN — Medication 650 MILLIGRAM(S): at 05:33

## 2020-12-04 RX ADMIN — BUDESONIDE AND FORMOTEROL FUMARATE DIHYDRATE 2 PUFF(S): 160; 4.5 AEROSOL RESPIRATORY (INHALATION) at 15:12

## 2020-12-04 RX ADMIN — ENOXAPARIN SODIUM 40 MILLIGRAM(S): 100 INJECTION SUBCUTANEOUS at 10:54

## 2020-12-04 RX ADMIN — SODIUM CHLORIDE 100 MILLILITER(S): 9 INJECTION INTRAMUSCULAR; INTRAVENOUS; SUBCUTANEOUS at 05:41

## 2020-12-04 RX ADMIN — OXYCODONE HYDROCHLORIDE 5 MILLIGRAM(S): 5 TABLET ORAL at 05:29

## 2020-12-04 RX ADMIN — CLOPIDOGREL BISULFATE 75 MILLIGRAM(S): 75 TABLET, FILM COATED ORAL at 05:30

## 2020-12-04 RX ADMIN — Medication 650 MILLIGRAM(S): at 14:07

## 2020-12-04 RX ADMIN — PANTOPRAZOLE SODIUM 40 MILLIGRAM(S): 20 TABLET, DELAYED RELEASE ORAL at 05:29

## 2020-12-04 RX ADMIN — OXYCODONE HYDROCHLORIDE 5 MILLIGRAM(S): 5 TABLET ORAL at 15:11

## 2020-12-04 RX ADMIN — Medication 650 MILLIGRAM(S): at 10:53

## 2020-12-04 RX ADMIN — OXYCODONE HYDROCHLORIDE 5 MILLIGRAM(S): 5 TABLET ORAL at 05:33

## 2020-12-04 RX ADMIN — Medication 30 MILLIGRAM(S): at 14:44

## 2020-12-04 NOTE — DISCHARGE NOTE PROVIDER - CARE PROVIDER_API CALL
Dorothy Espinal  [facility]  [address] [place] [address]  Phone: [phone]  Fax: [phone]  Follow Up Time: 2 weeks

## 2020-12-04 NOTE — PROGRESS NOTE ADULT - SUBJECTIVE AND OBJECTIVE BOX
GENERAL SURGERY PROGRESS NOTE     SHANTELLE SCOTT  80y  Female  Hospital day :1d  OVERNIGHT EVENTS: no acute events overnight     T(F): 97.6 (12-03-20 @ 14:25), Max: 97.6 (12-03-20 @ 14:25)  HR: 78 (12-03-20 @ 20:03) (78 - 87)  BP: 123/82 (12-03-20 @ 20:03) (123/82 - 180/91)  RR: 20 (12-03-20 @ 20:03) (18 - 20)  SpO2: 98% (12-03-20 @ 20:03) (98% - 100%)    PHYSICAL EXAM:  GENERAL: NAD, well-appearing  ABDOMEN: Soft, Nontender, Nondistended;   EXTREMITIES:  No clubbing, cyanosis, or edema    LABS  CAPILLARY BLOOD GLUCOSE                        12.4   16.88 )-----------( 282      ( 03 Dec 2020 14:58 )             40.3       Auto Neutrophil %: 75.7 % (12-03-20 @ 14:58)  Band Neutrophils %: 3.5 % (12-03-20 @ 14:58)    12-03    139  |  106  |  17  ----------------------------<  94  6.2<HH>   |  23  |  0.7      Calcium, Total Serum: 8.8 mg/dL (12-03-20 @ 14:58)      LFTs:             7.1  | 0.2  | 56       ------------------[100     ( 03 Dec 2020 14:58 )  4.3  | x    | 28          Lipase:53     Amylase:x         Lactate, Blood: 1.0 mmol/L (12-03-20 @ 14:58)  Blood Gas Venous - Lactate: 0.9 mmoL/L (12-03-20 @ 14:52)      Coags:     10.60  ----< 0.92    ( 03 Dec 2020 14:58 )     29.9        CARDIAC MARKERS ( 03 Dec 2020 14:58 )  x     / <0.01 ng/mL / x     / x     / x            Alcohol, Blood: <10 mg/dL (12-03-20 @ 14:58)

## 2020-12-04 NOTE — DISCHARGE NOTE NURSING/CASE MANAGEMENT/SOCIAL WORK - PATIENT PORTAL LINK FT
You can access the FollowMyHealth Patient Portal offered by [facility] by registering at the following website: [email or web address]. By joining [employer]’s FollowMyHealth portal, you will also be able to view your health information using other applications (apps) compatible with our system.

## 2020-12-04 NOTE — DISCHARGE NOTE PROVIDER - NSDCMRMEDTOKEN_GEN_ALL_CORE_FT
acetaminophen 325 mg oral tablet: 2 tab(s) orally every 6 hours  aspirin 81 mg oral tablet: 1 tab(s) orally every other day  atorvastatin 80 mg oral tablet: 1 tab(s) orally once a day  Bevespi Aerosphere 9 mcg-4.8 mcg/inh inhalation aerosol: 2 puff(s) inhaled 2 times a day  dilTIAZem 30 mg oral tablet: 1 tab(s) orally 3 times a day  Plavix 75 mg oral tablet: 1 tab(s) orally every other day  Simbrinza 1%- 0.2% ophthalmic suspension: 1 drop(s) to each affected eye 2 times a day  Symbicort 80 mcg-4.5 mcg/inh inhalation aerosol: 2 puff(s) inhaled 2 times a day

## 2020-12-04 NOTE — ED ADULT NURSE REASSESSMENT NOTE - NS ED NURSE REASSESS COMMENT FT1
Pt assessed. Pt is awake and alert. Denies any pain or discomfort at this time. Pt admitted; awaiting bed. Pt is not in any distress. Fall precautions maintained. Fluids running. Safety and comfort; will continue to monitor.

## 2020-12-04 NOTE — DISCHARGE NOTE PROVIDER - NSFOLLOWUPCLINICS_GEN_ALL_ED_FT
[facility]  Trauma Surgery  [address] [place] [address]  Phone: [phone]  Fax:   Follow Up Time: 2 weeks

## 2020-12-04 NOTE — DISCHARGE NOTE PROVIDER - NSDCCPCAREPLAN_GEN_ALL_CORE_FT
PRINCIPAL DISCHARGE DIAGNOSIS  Diagnosis: Fall  Assessment and Plan of Treatment: Found to have an age indeterminate mild T6 and L6 vertebral body compression fractures.  -Activity: Avoid heavy lifting or straining x 6-8 weeks. Wear your abdominal binder or TSLO brace.   -Pain: You may take OTC Tylenol or Motrin as needed for pain every 8-6 hours.  -Diet: Continue regular diet as tolerated.  -Follow-up: Please call to schedule follow-up appointments in 2 weeks with the trauma clinic, neurosurgery, and your primary care provider.      SECONDARY DISCHARGE DIAGNOSES  Diagnosis: Compression fracture of vertebra  Assessment and Plan of Treatment:

## 2020-12-04 NOTE — PROGRESS NOTE ADULT - ASSESSMENT
ASSESSMENT:  80yF w/ PMHx of HTN and CAD on ASA and Plavix seen as trauma consult s/p fall from steps (about 4 feet) earlier today. Denies LoC, head trauma, and is not on AC, just ASA/Plavix. Trauma assessment in ED: ABCs intact , GCS 15 , AAOx3. Patient reports that she was standing on a patricio stair plat form in the front of her daughter's house when she fell off the side, straight down. Patient reports she does not recall exactly what she landed on, but denies head trauma. Now is complaining of pain mostly in center of her upper back. Patient reports some shortness of breath immediately after fall that has since resolved.   Trauma assessment in ED: ABCs intact , GCS 15 , AAOx3,  FRANKS.     Patient's only injury identified does, age indeterminate mild T6 and L1 vertebral body compression fractures, do not correspond to her pain or trauma this evening. These are likely old fractures. Patient is requesting to go home tonight if she is cleared.    Injuries identified:   - Age indeterminate mild T6 and L1 vertebral body compression fractures    PLAN:     - No other signs of trauma  - PT/rehab  - hospitalist consult for comanagement

## 2020-12-04 NOTE — PROGRESS NOTE ADULT - ATTENDING COMMENTS
s/p fall   chest and back trauma   IS   pain control   social serviced consult  [place]

## 2020-12-04 NOTE — DISCHARGE NOTE PROVIDER - HOSPITAL COURSE
This is an 80 year old female with a PMH of HTN and CAD on ASA and Plavix who presented to the ED on 12/3/2020 as a trauma consult following a fall down some steps totalling approximately 4 feet while visiting her daughter from [place]. She denied head trauma or any loss of consciousness. Patient reported pain in the center of her upper back and shortness of breath immediately after the fall which resolved when in the ED. She was found to have an age-indeterminate mild T6 and L1 vertebral body compression fractures. She was also found to have mild intrahepatic biliary ductal dilation with cholelithiasis and a porcelain gallbladder, as well as two left ovarian/adnexal cystic lesions measuring up to 2.6cm each. Patient was evaluated by neurosurgery and they recommended conservative management with use of an abdominal binder or TLSO brace. Physical therapy evaluated the patient and recommended a rehabilitation facility. Following discussions with family, patient will be discharged home with care from her sons. Patient is stable at the time of discharge. You may take OTC Tylenol or Motrin every 6-8 hours as needed for pain. Please call to schedule follow-up appointments within 1-2 weeks with the trauma clinic, Dr. Espinal (neurosurgery) , and your primary care provider.

## 2020-12-05 LAB
SARS-COV-2 IGG SERPL QL IA: NEGATIVE — SIGNIFICANT CHANGE UP
SARS-COV-2 IGM SERPL IA-ACNC: <0.1 INDEX — SIGNIFICANT CHANGE UP

## 2020-12-08 DIAGNOSIS — M48.55XA COLLAPSED VERTEBRA, NOT ELSEWHERE CLASSIFIED, THORACOLUMBAR REGION, INITIAL ENCOUNTER FOR FRACTURE: ICD-10-CM

## 2020-12-08 DIAGNOSIS — Z87.891 PERSONAL HISTORY OF NICOTINE DEPENDENCE: ICD-10-CM

## 2020-12-08 DIAGNOSIS — I25.10 ATHEROSCLEROTIC HEART DISEASE OF NATIVE CORONARY ARTERY WITHOUT ANGINA PECTORIS: ICD-10-CM

## 2020-12-08 DIAGNOSIS — Z79.02 LONG TERM (CURRENT) USE OF ANTITHROMBOTICS/ANTIPLATELETS: ICD-10-CM

## 2020-12-08 DIAGNOSIS — K80.20 CALCULUS OF GALLBLADDER WITHOUT CHOLECYSTITIS WITHOUT OBSTRUCTION: ICD-10-CM

## 2020-12-08 DIAGNOSIS — N83.202 UNSPECIFIED OVARIAN CYST, LEFT SIDE: ICD-10-CM

## 2020-12-08 DIAGNOSIS — I10 ESSENTIAL (PRIMARY) HYPERTENSION: ICD-10-CM

## 2021-06-21 ENCOUNTER — TRANSCRIPTION ENCOUNTER (OUTPATIENT)
Age: 81
End: 2021-06-21

## 2022-01-01 ENCOUNTER — TRANSCRIPTION ENCOUNTER (OUTPATIENT)
Age: 82
End: 2022-01-01

## 2022-01-03 ENCOUNTER — APPOINTMENT (OUTPATIENT)
Age: 82
End: 2022-01-03

## 2022-01-03 PROBLEM — I25.10 ATHEROSCLEROTIC HEART DISEASE OF NATIVE CORONARY ARTERY WITHOUT ANGINA PECTORIS: Chronic | Status: ACTIVE | Noted: 2020-12-03

## 2022-01-03 PROBLEM — I10 ESSENTIAL (PRIMARY) HYPERTENSION: Chronic | Status: ACTIVE | Noted: 2020-12-03
